# Patient Record
Sex: FEMALE | Race: BLACK OR AFRICAN AMERICAN | NOT HISPANIC OR LATINO | Employment: FULL TIME | URBAN - METROPOLITAN AREA
[De-identification: names, ages, dates, MRNs, and addresses within clinical notes are randomized per-mention and may not be internally consistent; named-entity substitution may affect disease eponyms.]

---

## 2018-08-06 ENCOUNTER — HOSPITAL ENCOUNTER (EMERGENCY)
Facility: HOSPITAL | Age: 48
Discharge: HOME | End: 2018-08-06
Attending: EMERGENCY MEDICINE | Admitting: EMERGENCY MEDICINE
Payer: COMMERCIAL

## 2018-08-06 VITALS
WEIGHT: 127 LBS | RESPIRATION RATE: 18 BRPM | DIASTOLIC BLOOD PRESSURE: 65 MMHG | HEART RATE: 70 BPM | SYSTOLIC BLOOD PRESSURE: 114 MMHG | OXYGEN SATURATION: 100 % | TEMPERATURE: 98.2 F

## 2018-08-06 DIAGNOSIS — D64.9 ANEMIA: Primary | ICD-10-CM

## 2018-08-06 LAB
ABO GROUP BLD: NORMAL
ALBUMIN SERPL BCP-MCNC: 3.3 G/DL (ref 3.5–5)
ALP SERPL-CCNC: 56 U/L (ref 46–116)
ALT SERPL W P-5'-P-CCNC: 13 U/L (ref 12–78)
ANION GAP SERPL CALCULATED.3IONS-SCNC: 7 MMOL/L (ref 4–13)
AST SERPL W P-5'-P-CCNC: 15 U/L (ref 5–45)
ATRIAL RATE: 83 BPM
ATRIAL RATE: 86 BPM
BACTERIA UR QL AUTO: ABNORMAL /HPF
BASOPHILS # BLD AUTO: 0.01 THOUSANDS/ΜL (ref 0–0.1)
BASOPHILS NFR BLD AUTO: 0 % (ref 0–1)
BILIRUB SERPL-MCNC: 0.57 MG/DL (ref 0.2–1)
BILIRUB UR QL STRIP: ABNORMAL
BLD GP AB SCN SERPL QL: NEGATIVE
BUN SERPL-MCNC: 5 MG/DL (ref 5–25)
CALCIUM SERPL-MCNC: 9 MG/DL (ref 8.3–10.1)
CHLORIDE SERPL-SCNC: 106 MMOL/L (ref 100–108)
CLARITY UR: ABNORMAL
CO2 SERPL-SCNC: 27 MMOL/L (ref 21–32)
COLOR UR: ABNORMAL
COLOR, POC: NORMAL
CREAT SERPL-MCNC: 0.82 MG/DL (ref 0.6–1.3)
EOSINOPHIL # BLD AUTO: 0.22 THOUSAND/ΜL (ref 0–0.61)
EOSINOPHIL NFR BLD AUTO: 7 % (ref 0–6)
ERYTHROCYTE [DISTWIDTH] IN BLOOD BY AUTOMATED COUNT: 17.7 % (ref 11.6–15.1)
EXT PREG TEST URINE: NEGATIVE
GFR SERPL CREATININE-BSD FRML MDRD: 96 ML/MIN/1.73SQ M
GLUCOSE SERPL-MCNC: 87 MG/DL (ref 65–140)
GLUCOSE UR STRIP-MCNC: NEGATIVE MG/DL
HCT VFR BLD AUTO: 26.5 % (ref 34.8–46.1)
HGB BLD-MCNC: 7.1 G/DL (ref 11.5–15.4)
HGB UR QL STRIP.AUTO: NEGATIVE
HYALINE CASTS #/AREA URNS LPF: ABNORMAL /LPF
IMM GRANULOCYTES # BLD AUTO: 0.02 THOUSAND/UL (ref 0–0.2)
IMM GRANULOCYTES NFR BLD AUTO: 1 % (ref 0–2)
KETONES UR STRIP-MCNC: ABNORMAL MG/DL
LEUKOCYTE ESTERASE UR QL STRIP: NEGATIVE
LYMPHOCYTES # BLD AUTO: 0.75 THOUSANDS/ΜL (ref 0.6–4.47)
LYMPHOCYTES NFR BLD AUTO: 23 % (ref 14–44)
MCH RBC QN AUTO: 21.2 PG (ref 26.8–34.3)
MCHC RBC AUTO-ENTMCNC: 26.8 G/DL (ref 31.4–37.4)
MCV RBC AUTO: 79 FL (ref 82–98)
MONOCYTES # BLD AUTO: 0.57 THOUSAND/ΜL (ref 0.17–1.22)
MONOCYTES NFR BLD AUTO: 17 % (ref 4–12)
NEUTROPHILS # BLD AUTO: 1.77 THOUSANDS/ΜL (ref 1.85–7.62)
NEUTS SEG NFR BLD AUTO: 52 % (ref 43–75)
NITRITE UR QL STRIP: NEGATIVE
NON-SQ EPI CELLS URNS QL MICRO: ABNORMAL /HPF
NRBC BLD AUTO-RTO: 0 /100 WBCS
P AXIS: 111 DEGREES
P AXIS: 53 DEGREES
PH UR STRIP.AUTO: 6 [PH] (ref 4.5–8)
PLATELET # BLD AUTO: 213 THOUSANDS/UL (ref 149–390)
PMV BLD AUTO: 10.3 FL (ref 8.9–12.7)
POTASSIUM SERPL-SCNC: 3.1 MMOL/L (ref 3.5–5.3)
PR INTERVAL: 152 MS
PR INTERVAL: 158 MS
PROT SERPL-MCNC: 8.4 G/DL (ref 6.4–8.2)
PROT UR STRIP-MCNC: ABNORMAL MG/DL
QRS AXIS: 148 DEGREES
QRS AXIS: 33 DEGREES
QRSD INTERVAL: 82 MS
QRSD INTERVAL: 84 MS
QT INTERVAL: 356 MS
QT INTERVAL: 374 MS
QTC INTERVAL: 426 MS
QTC INTERVAL: 439 MS
RBC # BLD AUTO: 3.35 MILLION/UL (ref 3.81–5.12)
RBC #/AREA URNS AUTO: ABNORMAL /HPF
RH BLD: NEGATIVE
SODIUM SERPL-SCNC: 140 MMOL/L (ref 136–145)
SP GR UR STRIP.AUTO: 1.02 (ref 1–1.03)
SPECIMEN EXPIRATION DATE: NORMAL
T WAVE AXIS: -18 DEGREES
T WAVE AXIS: 187 DEGREES
TROPONIN I SERPL-MCNC: <0.02 NG/ML
UROBILINOGEN UR QL STRIP.AUTO: 0.2 E.U./DL
VENTRICULAR RATE: 83 BPM
VENTRICULAR RATE: 86 BPM
WBC # BLD AUTO: 3.34 THOUSAND/UL (ref 4.31–10.16)
WBC #/AREA URNS AUTO: ABNORMAL /HPF

## 2018-08-06 PROCEDURE — 36430 TRANSFUSION BLD/BLD COMPNT: CPT

## 2018-08-06 PROCEDURE — 96366 THER/PROPH/DIAG IV INF ADDON: CPT

## 2018-08-06 PROCEDURE — 84484 ASSAY OF TROPONIN QUANT: CPT | Performed by: EMERGENCY MEDICINE

## 2018-08-06 PROCEDURE — 93005 ELECTROCARDIOGRAM TRACING: CPT

## 2018-08-06 PROCEDURE — 86900 BLOOD TYPING SEROLOGIC ABO: CPT | Performed by: EMERGENCY MEDICINE

## 2018-08-06 PROCEDURE — 81001 URINALYSIS AUTO W/SCOPE: CPT

## 2018-08-06 PROCEDURE — 86901 BLOOD TYPING SEROLOGIC RH(D): CPT | Performed by: EMERGENCY MEDICINE

## 2018-08-06 PROCEDURE — 36415 COLL VENOUS BLD VENIPUNCTURE: CPT

## 2018-08-06 PROCEDURE — 96365 THER/PROPH/DIAG IV INF INIT: CPT

## 2018-08-06 PROCEDURE — P9016 RBC LEUKOCYTES REDUCED: HCPCS

## 2018-08-06 PROCEDURE — 36415 COLL VENOUS BLD VENIPUNCTURE: CPT | Performed by: EMERGENCY MEDICINE

## 2018-08-06 PROCEDURE — 96361 HYDRATE IV INFUSION ADD-ON: CPT

## 2018-08-06 PROCEDURE — 93010 ELECTROCARDIOGRAM REPORT: CPT | Performed by: INTERNAL MEDICINE

## 2018-08-06 PROCEDURE — 86920 COMPATIBILITY TEST SPIN: CPT

## 2018-08-06 PROCEDURE — 81025 URINE PREGNANCY TEST: CPT | Performed by: EMERGENCY MEDICINE

## 2018-08-06 PROCEDURE — 80053 COMPREHEN METABOLIC PANEL: CPT | Performed by: EMERGENCY MEDICINE

## 2018-08-06 PROCEDURE — 86850 RBC ANTIBODY SCREEN: CPT | Performed by: EMERGENCY MEDICINE

## 2018-08-06 PROCEDURE — 85025 COMPLETE CBC W/AUTO DIFF WBC: CPT | Performed by: EMERGENCY MEDICINE

## 2018-08-06 PROCEDURE — 99284 EMERGENCY DEPT VISIT MOD MDM: CPT

## 2018-08-06 RX ORDER — POTASSIUM CHLORIDE 20 MEQ/1
40 TABLET, EXTENDED RELEASE ORAL ONCE
Status: COMPLETED | OUTPATIENT
Start: 2018-08-06 | End: 2018-08-06

## 2018-08-06 RX ADMIN — POTASSIUM CHLORIDE 20 MEQ: 2 INJECTION, SOLUTION, CONCENTRATE INTRAVENOUS at 18:04

## 2018-08-06 RX ADMIN — SODIUM CHLORIDE 1000 ML: 0.9 INJECTION, SOLUTION INTRAVENOUS at 17:30

## 2018-08-06 RX ADMIN — POTASSIUM CHLORIDE 40 MEQ: 1500 TABLET, EXTENDED RELEASE ORAL at 17:28

## 2018-08-06 NOTE — ED PROVIDER NOTES
History  Chief Complaint   Patient presents with    Dizziness     pt reports dizziness/weakness and no appetite since 8/1/18  she also reports some nausea and diarrhea  Patient is a 42-year-old female presenting to the emergency room for dizziness(room spinning), lightheadedness, weakness, decreased appetite since August 1st along with diarrhea approximately 4-5 episodes of loose stool for about 3 days  The patient's past medical history of multiple uterine fibroids as well documented history of menorrhagia for which she was being treated by [facility] and per patient was supposed to be receiving Lupron injections but has yet to start them  Patient no longer wants to be seen as she does not like this physician over there  Patient has a longstanding history of anemia due to her menorrhagia and uterine fibroids and states she is supposed to be on iron pills for her anemia but is not taking them because she was hoping for the Lupron shot to help her  States she has had discussions with her OBGYN about having hysterectomy due to the symptoms and may be ready for that option  Denies fevers/night sweats/chills, headache, chest pain, shortness of breath, abdominal pain, constipation, dysuria, hematochezia or melena          History provided by:  Patient and medical records   used: No    Dizziness   Quality:  Room spinning  Severity:  Moderate  Onset quality:  Gradual  Duration:  5 days  Timing:  Constant  Progression:  Unchanged  Chronicity:  New  Relieved by:  Nothing  Worsened by:  Nothing  Ineffective treatments:  None tried  Associated symptoms: diarrhea, nausea and weakness    Associated symptoms: no blood in stool, no chest pain, no headaches, no palpitations, no shortness of breath and no vomiting    Diarrhea:     Quality:  Watery    Number of occurrences:  4-5/day    Severity:  Moderate    Duration:  3 days    Timing:  Constant    Progression:  Unchanged  Nausea:     Severity: Moderate    Onset quality:  Gradual    Duration:  5 days    Timing:  Constant    Progression:  Unchanged  Risk factors: anemia    Risk factors: no heart disease, no hx of stroke, no hx of vertigo, no Meniere's disease, no multiple medications and no new medications        None       History reviewed  No pertinent past medical history  Past Surgical History:   Procedure Laterality Date    UTERINE FIBROID SURGERY         History reviewed  No pertinent family history  I have reviewed and agree with the history as documented  Social History   Substance Use Topics    Smoking status: Never Smoker    Smokeless tobacco: Never Used    Alcohol use No        Review of Systems   Constitutional: Positive for appetite change and fatigue  Negative for chills, diaphoresis and fever  HENT: Negative for nosebleeds, postnasal drip, sinus pain, sinus pressure, sneezing and sore throat  Eyes: Negative for photophobia, pain and redness  Respiratory: Negative for apnea, cough, chest tightness, shortness of breath, wheezing and stridor  Cardiovascular: Negative for chest pain, palpitations and leg swelling  Gastrointestinal: Positive for abdominal distention (Chronic due to fibroids), diarrhea and nausea  Negative for abdominal pain, blood in stool, constipation and vomiting  Genitourinary: Negative for difficulty urinating, dysuria, flank pain, frequency, hematuria and urgency  Musculoskeletal: Negative for arthralgias, back pain, gait problem, joint swelling, myalgias, neck pain and neck stiffness  Skin: Negative for color change, pallor, rash and wound  Neurological: Positive for dizziness, weakness and light-headedness  Negative for facial asymmetry, numbness and headaches  Psychiatric/Behavioral: Negative for behavioral problems, confusion and decreased concentration         Physical Exam  ED Triage Vitals   Temperature Pulse Respirations Blood Pressure SpO2   08/06/18 1559 08/06/18 1559 08/06/18 1559 08/06/18 1559 08/06/18 1559   97 9 °F (36 6 °C) 85 16 116/60 100 %      Temp Source Heart Rate Source Patient Position - Orthostatic VS BP Location FiO2 (%)   08/06/18 1559 08/06/18 1559 08/06/18 1559 08/06/18 1559 --   Oral Monitor Sitting Left arm       Pain Score       08/06/18 1645       No Pain           Orthostatic Vital Signs  Vitals:    08/06/18 1815 08/06/18 1908 08/06/18 1923 08/06/18 1943   BP: 111/58 100/58 119/66 117/69   Pulse: 76 74 78 83   Patient Position - Orthostatic VS: Sitting          Physical Exam   Constitutional: She is oriented to person, place, and time  She appears well-developed and well-nourished  No distress  HENT:   Head: Normocephalic and atraumatic  Right Ear: External ear normal    Left Ear: External ear normal    Nose: Nose normal    Mouth/Throat: Oropharynx is clear and moist  No oropharyngeal exudate  Eyes: Conjunctivae and EOM are normal  Pupils are equal, round, and reactive to light  Right eye exhibits no discharge  Left eye exhibits no discharge  No scleral icterus  Neck: Normal range of motion  Neck supple  Cardiovascular: Normal rate, regular rhythm, normal heart sounds and intact distal pulses  Exam reveals no gallop and no friction rub  No murmur heard  Pulmonary/Chest: Effort normal and breath sounds normal  No respiratory distress  She has no wheezes  She has no rales  Abdominal: Soft  Bowel sounds are normal  She exhibits distension (Patient states has been like this for a while due to uterine fibroids)  She exhibits no mass  There is tenderness ( mild diffuse tenderness)  There is no rebound and no guarding  No hernia  Musculoskeletal: Normal range of motion  She exhibits no edema, tenderness or deformity  Neurological: She is alert and oriented to person, place, and time  No cranial nerve deficit or sensory deficit  She exhibits normal muscle tone  Skin: Skin is warm and dry  No rash noted  She is not diaphoretic  No erythema  No pallor  Psychiatric: She has a normal mood and affect  Her behavior is normal  Judgment and thought content normal    Nursing note and vitals reviewed        ED Medications  Medications   potassium chloride 20 mEq in dextrose 5 % 100 mL IVPB (0 mEq Intravenous Stopped 8/6/18 2006)   potassium chloride (K-DUR,KLOR-CON) CR tablet 40 mEq (40 mEq Oral Given 8/6/18 1728)   sodium chloride 0 9 % bolus 1,000 mL (0 mL Intravenous Stopped 8/6/18 2006)       Diagnostic Studies  Results Reviewed     Procedure Component Value Units Date/Time    POCT pregnancy, urine [[id number]]  (Normal) Resulted:  08/06/18 1809    Lab Status:  Final result Updated:  08/06/18 1830     EXT PREG TEST UR (Ref: Negative) Negative    POCT urinalysis dipstick [[id number]]  (Normal) Resulted:  08/06/18 1809    Lab Status:  Final result Updated:  08/06/18 1829     Color, UA (SEE RESULTS)    Urine Microscopic [[id number]]  (Abnormal) Collected:  08/06/18 1821    Lab Status:  Final result Specimen:  Urine Updated:  08/06/18 1829     RBC, UA 2-4 (A) /hpf      WBC, UA 4-10 (A) /hpf      Epithelial Cells Moderate (A) /hpf      Bacteria, UA Occasional /hpf      Hyaline Casts, UA 10-25 (A) /lpf     ED Urine Macroscopic [[id number]]  (Abnormal) Collected:  08/06/18 1821    Lab Status:  Final result Specimen:  Urine Updated:  08/06/18 1809     Color, UA Kya     Clarity, UA Slightly Cloudy     pH, UA 6 0     Leukocytes, UA Negative     Nitrite, UA Negative     Protein,  (2+) (A) mg/dl      Glucose, UA Negative mg/dl      Ketones, UA Trace (A) mg/dl      Urobilinogen, UA 0 2 E U /dl      Bilirubin, UA Interference- unable to analyze (A)     Blood, UA Negative     Specific Gravity, UA 1 025    Narrative:       CLINITEK RESULT    Comprehensive metabolic panel [[id number]]  (Abnormal) Collected:  08/06/18 1608    Lab Status:  Final result Specimen:  Blood from Arm, Left Updated:  08/06/18 1648     Sodium 140 mmol/L      Potassium 3 1 (L) mmol/L      Chloride 106 mmol/L CO2 27 mmol/L      Anion Gap 7 mmol/L      BUN 5 mg/dL      Creatinine 0 82 mg/dL      Glucose 87 mg/dL      Calcium 9 0 mg/dL      AST 15 U/L      ALT 13 U/L      Alkaline Phosphatase 56 U/L      Total Protein 8 4 (H) g/dL      Albumin 3 3 (L) g/dL      Total Bilirubin 0 57 mg/dL      eGFR 96 ml/min/1 73sq m     Narrative:         National Kidney Disease Education Program recommendations are as follows:  GFR calculation is accurate only with a steady state creatinine  Chronic Kidney disease less than 60 ml/min/1 73 sq  meters  Kidney failure less than 15 ml/min/1 73 sq  meters  Troponin I [[id number]]  (Normal) Collected:  08/06/18 1608    Lab Status:  Final result Specimen:  Blood from Arm, Left Updated:  08/06/18 1641     Troponin I <0 02 ng/mL     CBC and differential [[id number]]  (Abnormal) Collected:  08/06/18 1608    Lab Status:  Final result Specimen:  Blood from Arm, Left Updated:  08/06/18 1623     WBC 3 34 (L) Thousand/uL      RBC 3 35 (L) Million/uL      Hemoglobin 7 1 (L) g/dL      Hematocrit 26 5 (L) %      MCV 79 (L) fL      MCH 21 2 (L) pg      MCHC 26 8 (L) g/dL      RDW 17 7 (H) %      MPV 10 3 fL      Platelets 954 Thousands/uL      nRBC 0 /100 WBCs      Neutrophils Relative 52 %      Immat GRANS % 1 %      Lymphocytes Relative 23 %      Monocytes Relative 17 (H) %      Eosinophils Relative 7 (H) %      Basophils Relative 0 %      Neutrophils Absolute 1 77 (L) Thousands/µL      Immature Grans Absolute 0 02 Thousand/uL      Lymphocytes Absolute 0 75 Thousands/µL      Monocytes Absolute 0 57 Thousand/µL      Eosinophils Absolute 0 22 Thousand/µL      Basophils Absolute 0 01 Thousands/µL                  No orders to display         Procedures  Procedures      Phone Consults  ED Phone Contact    ED Course  ED Course as of Aug 06 2019   [facility] Aug 06, 2018   1950 Patient received medicine approximately 7:20 p m                                  [facility]  Number of Diagnoses or Management Options  Anemia: Amount and/or Complexity of Data Reviewed  Clinical lab tests: ordered and reviewed  Tests in the medicine section of CPT®: ordered and reviewed    Risk of Complications, Morbidity, and/or Mortality  Presenting problems: moderate  Diagnostic procedures: low  Management options: low  General comments: Patient has chronic anemia due to her menorrhagia from multiple uterine fibroids and is not taking her iron pills  Since patient is symptomatic she will be given 1 U of PRBCs and then sent home with close follow-up with [facility]  Patient's unit of blood discussed return precautions with patient and that she should call the OB gyn on the discharge paperwork tomorrow for an appointment she states her understanding  Patient Progress  Patient progress: stable    CritCare Time    Disposition  Final diagnoses:   Anemia     Time reflects when diagnosis was documented in both MDM as applicable and the Disposition within this note     Time User Action Codes Description Comment    8/6/2018  7:03 PM Geneconcepcion Les Add [D64 9] Anemia       ED Disposition     ED Disposition Condition Comment    Discharge  Pippa Knight discharge to home/self care  Condition at discharge: Stable        Follow-up Information     Follow up With Specialties Details Why Contact Info Additional Eva 455, DO Obstetrics and Gynecology, Obstetrics, Gynecology Call in 1 day for appointment/ establishment of care [place] [phone]       [address] [place] [address] Emergency Department Emergency Medicine  As needed, If symptoms worsen [id number] [facility] ED, [address], [place], [address]          Patient's Medications    No medications on file     No discharge procedures on file  ED Provider  Attending physically available and evaluated Pippa Knight I managed the patient along with the ED Attending      Electronically Signed by         Ike Valadez DO  08/06/18 2020

## 2018-08-06 NOTE — DISCHARGE INSTRUCTIONS
Anemia   WHAT YOU NEED TO KNOW:   Anemia is a low number of red blood cells or a low amount of hemoglobin in your red blood cells  Hemoglobin is a protein that helps carry oxygen throughout your body  Red blood cells use iron to create hemoglobin  Anemia may develop if your body does not have enough iron  It may also develop if your body does not make enough red blood cells or they die faster than your body can make them  DISCHARGE INSTRUCTIONS:   Call 911 or have someone call 911 for any of the following:   · You lose consciousness  · You have severe chest pain  Seek care immediately if:   · You have dark or bloody bowel movements  Contact your healthcare provider if:   · Your symptoms are worse, even after treatment  · You have questions or concerns about your condition or care  Medicines:   · Iron or folic acid supplements  help increase your red blood cell and hemoglobin levels  · Vitamin B12 injections  may help boost your red blood cell level and decrease your symptoms  Ask your healthcare provider how to inject B12  · Take your medicine as directed  Contact your healthcare provider if you think your medicine is not helping or if you have side effects  Tell him of her if you are allergic to any medicine  Keep a list of the medicines, vitamins, and herbs you take  Include the amounts, and when and why you take them  Bring the list or the pill bottles to follow-up visits  Carry your medicine list with you in case of an emergency  Prevent anemia:  Eat healthy foods rich in iron and vitamin C  Nuts, meat, dark leafy green vegetables, and beans are high in iron and protein  Vitamin C helps your body absorb iron  Foods rich in vitamin C include oranges and other citrus fruits  Ask your healthcare provider for a list of other foods that are high in iron or vitamin C  Ask if you need to be on a special diet     Follow up with your healthcare provider as directed:  Write down your questions so you remember to ask them during your visits  © 2017 [employer] Information is for End User's use only and may not be sold, redistributed or otherwise used for commercial purposes  All illustrations and images included in CareNotes® are the copyrighted property of A [employer] M [employer]  or Antoine Melara  The above information is an  only  It is not intended as medical advice for individual conditions or treatments  Talk to your doctor, nurse or pharmacist before following any medical regimen to see if it is safe and effective for you

## 2018-08-06 NOTE — ED ATTENDING ATTESTATION
Marjorie Kayser, MD, saw and evaluated the patient  I have discussed the patient with the resident/non-physician practitioner and agree with the resident's/non-physician practitioner's findings, Plan of Care, and MDM as documented in the resident's/non-physician practitioner's note, except where noted  All available labs and Radiology studies were reviewed  At this point I agree with the current assessment done in the Emergency Department  I have conducted an independent evaluation of this patient a history and physical is as follows:      Critical Care Time  CritCare Time    Procedures     45 yo female c/o dizziness, weakness for last five days  Pt with hx of uterine fibroids with excessive bleeding at times and scheduled to get lupron  No vaginal bleeding currently  No current meds  No headache, no cp, no sob, no abdominal pain, no urinary complaints, no n/v/d, no blood in stools  Vss, afebrile, lungs cta, rrr, abdomen soft distended, firm, tender uterus, no neuro deficits  Cardiac workup, urine, ivf, potassium replacement

## 2018-08-07 LAB
ABO GROUP BLD BPU: NORMAL
BPU ID: NORMAL
CROSSMATCH: NORMAL
UNIT DISPENSE STATUS: NORMAL
UNIT PRODUCT CODE: NORMAL
UNIT RH: NORMAL

## 2018-08-15 NOTE — SOCIAL WORK
CM consulted to f/u with Pt needing assistance scheduling OBGYN appointment  No answer  Left VM, awaiting return call

## 2018-08-16 NOTE — SOCIAL WORK
CM contacted Pt again  Pt reported she was seeing an OBGYN at [facility] but would like to switch to   Pt also had questions about the medication she was prescribed  CM contacted [facility] at [phone] and spoke with Mayank Fung who reported it would be easier to have the Pt call to ask questions and schedule an appointment  CM contacted Pt back to inform her of above  Pt reported she had already called them and scheduled an appointment for September 5th, but wanted to know if she could get an earlier appointment  Pt reported she would call them back